# Patient Record
Sex: MALE | Race: AMERICAN INDIAN OR ALASKA NATIVE
[De-identification: names, ages, dates, MRNs, and addresses within clinical notes are randomized per-mention and may not be internally consistent; named-entity substitution may affect disease eponyms.]

---

## 2022-01-07 ENCOUNTER — HOSPITAL ENCOUNTER (EMERGENCY)
Dept: HOSPITAL 5 - ED | Age: 32
Discharge: HOME | End: 2022-01-07
Payer: SELF-PAY

## 2022-01-07 VITALS — SYSTOLIC BLOOD PRESSURE: 125 MMHG | DIASTOLIC BLOOD PRESSURE: 86 MMHG

## 2022-01-07 DIAGNOSIS — F45.8: Primary | ICD-10-CM

## 2022-01-07 PROCEDURE — 99282 EMERGENCY DEPT VISIT SF MDM: CPT

## 2022-01-07 NOTE — EMERGENCY DEPARTMENT REPORT
ED General Adult HPI





- General


Stated complaint: STROKE


Time Seen by Provider: 01/07/22 10:16





- History of Present Illness


Initial comments: 





Patient presented with an episode concerning for stroke for him.  He was at 

work.  He states that he has been working hard all day.  He has not had anything

to eat.  He is not rested.  He started having problems with tingling in both 

arms and hands.  His hands started to spasm and tightening up.  He had tingling 

around his face.  He had chest tightness.  Coworkers told him that they thought 

he might be about to have a stroke.  He decided to go home.  He was resting at 

home.  Symptoms were abating, but did not resolve.  He came here for evaluation.

 After he arrived here, his symptoms had completely returned to normal.  He did 

not have any vomiting or diarrhea.  There is no cough or congestion.  He states 

he has never had symptoms like this before.  There is no history of recent 

travel or trauma.  He does admit that he has been working more than usual.  He 

has been drinking more caffeine than usual.  He has been drinking less water 

than usual.





- Related Data


                                    Allergies











Allergy/AdvReac Type Severity Reaction Status Date / Time


 


No Known Allergies Allergy   Unverified 01/07/22 10:02














ED Review of Systems


ROS: 


Stated complaint: STROKE


Other details as noted in HPI





Comment: All other systems reviewed and negative


Constitutional: denies: fever


Eyes: denies: vision change


ENT: denies: epistaxis


Respiratory: denies: cough


Cardiovascular: as per HPI


Endocrine: denies: unexplained weight loss


Gastrointestinal: denies: abdominal pain


Genitourinary: denies: dysuria


Musculoskeletal: denies: back pain


Skin: denies: rash


Neurological: as per HPI, paresthesias


Hematological/Lymphatic: denies: easy bruising





ED Past Medical Hx





- Past Medical History


Previous Medical History?: No





- Family History


Family history: no significant





ED Physical Exam





- General


Limitations: No Limitations, Other (Pulse ox noted and normal at 98%)


General appearance: alert, in no apparent distress





- Head


Head exam: Present: atraumatic, normocephalic





- Eye


Eye exam: Present: normal appearance, EOMI





- ENT


ENT exam: Present: normal orophraynx, normal external ear exam





- Neck


Neck exam: Present: normal inspection.  Absent: meningismus





- Respiratory


Respiratory exam: Present: normal lung sounds bilaterally.  Absent: respiratory 

distress





- Cardiovascular


Cardiovascular Exam: Present: regular rate, normal rhythm





- Extremities Exam


Extremities exam: Present: normal capillary refill





- Back Exam


Back exam: Present: full ROM





- Neurological Exam


Neurological exam: Present: alert, oriented X3, CN II-XII intact, normal gait.  

Absent: motor sensory deficit





- Psychiatric


Psychiatric exam: Present: normal affect, normal mood





- Skin


Skin exam: Present: warm, dry





ED Course





- Reevaluation(s)


Reevaluation #1: 





01/07/22 10:23


Patient was discharged





ED Medical Decision Making





- Medical Decision Making





Patient presents with a hyperventilation syndrome including carpopedal spasm.  

There is no evidence of fever.  He does not have ongoing symptoms.  I do not 

believe this represents any kind of electrolyte or hypoglycemic episode.  

Patient does not have chest pain or shortness of breath.  He has no evidence of 

palpitations.  There is no evidence of dysrhythmia on exam.  I do believe this 

was hyperventilation related to the symptoms he was having at work.  He was 

discharged.


Critical Care Time: No


Critical care attestation.: 


If time is entered above; I have spent that time in minutes in the direct care 

of this critically ill patient, excluding procedure time.








ED Disposition


Clinical Impression: 


 Hyperventilation syndrome





Disposition: 01 HOME / SELF CARE / HOMELESS


Is pt being admited?: No


Condition: Stable


Instructions:  Hyperventilation


Additional Instructions: 


Drink plenty water including Gatorade.  Return for problems.  Follow-up with 

your regular doctor for recheck.  When the symptoms start to occur again, rest 

and breathe slowly.


Referrals: 


PRIMARY CARE,MD [Referring] - 3-5 Days


VALARIE PENA MD [Staff Physician] - 3-5 Days